# Patient Record
Sex: FEMALE | Race: WHITE
[De-identification: names, ages, dates, MRNs, and addresses within clinical notes are randomized per-mention and may not be internally consistent; named-entity substitution may affect disease eponyms.]

---

## 2017-07-03 ENCOUNTER — HOSPITAL ENCOUNTER (OUTPATIENT)
Dept: HOSPITAL 76 - DI | Age: 47
Discharge: HOME | End: 2017-07-03
Attending: FAMILY MEDICINE
Payer: COMMERCIAL

## 2017-07-03 DIAGNOSIS — M25.521: Primary | ICD-10-CM

## 2017-07-03 NOTE — XRAY REPORT
THREE-VIEW RIGHT ELBOW:  07/03/2017

 

CLINICAL INDICATION:  Pain, numbness.

 

FINDINGS:  AP, lateral, oblique views of the right elbow demonstrate no evidence of fracture or dislo
cation.  The joint spaces are preserved.  No effusion is present.  No foreign body is seen. 

 

IMPRESSION:  NORMAL RIGHT ELBOW. 

 

 

 

DD:07/03/2017 16:03:48  DT: 07/03/2017 17:37  JOB #: [id number]  EXT JOB #:[id number]

## 2017-08-21 ENCOUNTER — HOSPITAL ENCOUNTER (EMERGENCY)
Dept: HOSPITAL 76 - ED | Age: 47
Discharge: HOME | End: 2017-08-21
Payer: COMMERCIAL

## 2017-08-21 VITALS — SYSTOLIC BLOOD PRESSURE: 116 MMHG | DIASTOLIC BLOOD PRESSURE: 73 MMHG

## 2017-08-21 DIAGNOSIS — S50.11XA: Primary | ICD-10-CM
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
## 2017-08-21 PROCEDURE — 99283 EMERGENCY DEPT VISIT LOW MDM: CPT

## 2017-08-21 PROCEDURE — 73090 X-RAY EXAM OF FOREARM: CPT

## 2017-08-21 NOTE — ED PHYSICIAN DOCUMENTATION
PD HPI Fall





- Stated complaint


Stated Complaint: GLF





- Chief complaint


Chief Complaint: Ext Problem





- History obtained from


History obtained from: Patient, Family (Spouse)





- History of Present Illness


Mechanism of injury: Slipped


Fall distance: Sitting position


Where injury occurred: Home


Timing - onset: How many hours ago (<1)


Injury(ies) location: Right Upper Extremity


Associated symptoms: No: LOC, Weakness, Paresthesias, Nausea / vomiting


Worsens with: Palpation


Similar symptoms before: Has not had sx before





- Treatment prior to arrival


Treatment prior to arrival: 





The patient is a 46-year-old female who slipped on her stairs this morning, 

sliding down about 6 steps, and catching herself with her right arm on the 

railing. She presents now with pain and ecchymosis in her right upper 

extremity. She is right-hand dominant.  She denies any other injuries.She does 

not take anticoagulant medication.





Review of Systems


Constitutional: denies: Fever


Ears: denies: Tinnitus/ringing


Nose: denies: Congestion


Cardiac: denies: Chest pain / pressure


Respiratory: denies: Dyspnea


GI: denies: Abdominal Pain, Nausea, Vomiting


Skin: denies: Laceration (s)


Musculoskeletal: reports: Extremity pain (Right upper extremity.).  denies: 

Neck pain, Back pain


Neurologic: denies: Focal weakness, Numbness, Head injury, LOC





PD PAST MEDICAL HISTORY





- Past Medical History


Past Medical History: No





- Past Surgical History


Past Surgical History: Yes





- Present Medications


Home Medications: 


 Ambulatory Orders











 Medication  Instructions  Recorded  Confirmed


 


Naproxen [Naprosyn] 500 mg PO BID PRN #20 tablet 08/21/17 














- Allergies


Allergies/Adverse Reactions: 


 Allergies











Allergy/AdvReac Type Severity Reaction Status Date / Time


 


Sulfa (Sulfonamide Allergy  Emesis Verified 08/21/17 09:07





Antibiotics)     














- Living Situation


Living Situation: reports: With spouse/s.o.





- Social History


Does the pt smoke?: No


Smoking Status: Never smoker


Does the pt drink ETOH?: No


Does the pt have substance abuse?: No





- Immunizations


Immunizations are current?: Yes





- POLST


Patient has POLST: No





PD ED PE NORMAL





- Vitals


Vital signs reviewed: Yes (Borderline hypertension initially.)





- General


General: Alert and oriented X 3, Well developed/nourished





- HEENT


HEENT: Atraumatic, EOMI





- Neck


Neck: No bony TTP, No adenopathy, Other (Full cervical range of motion.)





- Cardiac


Cardiac: RRR, No murmur





- Respiratory


Respiratory: No respiratory distress, Clear bilaterally, Other (No chest wall 

tenderness.)





- Abdomen


Abdomen: Soft, Non tender





- Back


Back: No CVA TTP, No spinal TTP





- Derm


Derm: No rash





- Extremities


Extremities: No deformity, Other (There is ecchymosis at the posterior aspect 

of the right upper arm, the posterior medial aspect of the right proximal 

forearm, as well as the right distal forearm.  She has full range of motion of 

the shoulder, elbow, and wrist, including supination and pronation of the 

forearm. There is tenderness to palpation of the forearm.  Distal neurovascular 

is intact.)





- Neuro


Neuro: Alert and oriented X 3, No motor deficit, No sensory deficit, Normal 

speech





Results





- Vitals


Vitals: 


 Oxygen











O2 Source                      Room air

















PD MEDICAL DECISION MAKING





- ED course


Complexity details: reviewed results, re-evaluated patient, considered 

differential, d/w patient, d/w family


ED course: 


The patient's presentation is significant for contusions to the right arm and 

forearm caused by falling down stairs.  X-ray of the forearm reveals no bony 

abnormality.  Treatment in the emergency department included administration of 

ibuprofen 800 mg orally.  I discussed with her and her  the expected 

course of injury, symptomatic treatment and outpatient follow-up, as well as 

potentially worrisome signs or symptoms that should prompt reevaluation in the 

emergency department.








Departure





- Departure


Disposition: 01 Home, Self Care


Clinical Impression: 


 Fall (on) (from) other stairs and steps, initial encounter





Contusion of right forearm, initial encounter


Qualifiers:


 Encounter type: initial encounter Qualified Code(s): S50.11XA - Contusion of 

right forearm, initial encounter


Condition: Stable


Instructions:  ED Contusion Upper Ext


Follow-Up: 


Julio Montanez DO [Provider Admit Priv/Credential] - 


Prescriptions: 


Naproxen [Naprosyn] 500 mg PO BID PRN #20 tablet


 PRN Reason: Pain


Comments: 


Apply ice pack to the sore areas intermittently for the next 3 days.


You can use Naprosyn as prescribed if needed for pain.  Alternatively you could 

use ibuprofen, up to 800 mg 3 times daily.


Let your pain be your guide to activity level.


Follow-up with your primary physician, or return to the emergency department, 

if you develop markedly increasing pain, or otherwise worsening symptoms.


Discharge Date/Time: 08/21/17 10:27

## 2017-08-21 NOTE — XRAY REPORT
EXAM:

RIGHT FOREARM RADIOGRAPHY

 

EXAM DATE: 8/21/2017 10:01 AM.

 

CLINICAL HISTORY: Fall with injury right forearm. Forearm pain.

 

COMPARISON: None.

 

TECHNIQUE: 2 views.

 

FINDINGS: 

Bones: Normal. No fractures or bone lesions.

 

Joints: Normal. No effusions or subluxations in the visualized wrist or elbow joints.

 

Soft Tissues: Normal. No soft tissue swelling.

 

IMPRESSION: No fracture.

 

[facility]

Referring Provider Line: [phone]

 

SITE ID: [id number]

## 2018-05-22 ENCOUNTER — HOSPITAL ENCOUNTER (EMERGENCY)
Age: 48
Discharge: HOME | End: 2018-05-22
Payer: COMMERCIAL

## 2018-05-22 VITALS
SYSTOLIC BLOOD PRESSURE: 139 MMHG | DIASTOLIC BLOOD PRESSURE: 80 MMHG | OXYGEN SATURATION: 98 % | HEART RATE: 78 BPM | TEMPERATURE: 97.6 F | RESPIRATION RATE: 20 BRPM

## 2018-05-22 VITALS
SYSTOLIC BLOOD PRESSURE: 143 MMHG | HEART RATE: 67 BPM | OXYGEN SATURATION: 99 % | TEMPERATURE: 98.42 F | DIASTOLIC BLOOD PRESSURE: 86 MMHG | RESPIRATION RATE: 16 BRPM

## 2018-05-22 VITALS — BODY MASS INDEX: 30.7 KG/M2

## 2018-05-22 DIAGNOSIS — M54.16: Primary | ICD-10-CM

## 2018-05-22 PROCEDURE — 96372 THER/PROPH/DIAG INJ SC/IM: CPT

## 2018-05-22 PROCEDURE — 99283 EMERGENCY DEPT VISIT LOW MDM: CPT

## 2018-05-22 PROCEDURE — 99282 EMERGENCY DEPT VISIT SF MDM: CPT

## 2018-05-22 NOTE — ED.BACK
"HPI - Back Pain/Injury
General
Chief Complaint: Back Pain/Injury
Stated Complaint: RT LEG PAIN
Time Seen by Provider: 05/22/18 21:28
Source: patient and family
Mode of arrival: ambulatory
Limitations: no limitations
History of Present Illness
HPI Narrative: Patient returns to the emergency department for the 3rd time in the last week.  She has severe right lower back pain with radiation into her right leg.  She denies any urinary or bowel complaints. She denies any numbness, weakness or 
foot drop.  She does state that she has a burning sensation on her medial right thigh and denies any injury or rash. She has been seen, as stated, twice in our department and treated for sciatica with opioids, muscle lack SIRS, anti-inflammatories, 
and steroids.  She has seen her primary care provider whom has suggested physical therapy but she has been unable thus far.
MD Complaint: back pain
Onset (ago): day(s)
Duration: constant
Similar Symptoms Previously: Yes
Location: lumbar spine and right lower back
Severity: severe
Quality: burning, sharp and stabbing
Radiation: right leg
Relieving factors: none
Exacerbating factors: none
Associated symptoms: denies other symptoms
Related Data
Previous Rx's

 Medication  Instructions  Recorded
cyclobenzaprine 10 mg PO TID PRN #7 tab 05/03/18
hydrocodone-acetaminophen 1 tab PO Q4-6H PRN #7 tab 05/03/18
oxycodone-acetaminophen [Percocet] 1 tab PO Q4-6H PRN #15 tab 05/05/18
prednisone 60 mg PO DAILY #15 tab 05/05/18
diazepam [Valium] 5 mg PO TID PRN #14 tab 05/22/18
hydrocodone-acetaminophen 1 tab PO Q4-6H PRN #14 tab 05/22/18
ketorolac 10 mg PO Q6H #20 tab 05/22/18
prednisone See Label Instructions .ROUTE 05/22/18
 .COMPLEX #30 tab 


Allergies

Allergy/AdvReac Type Severity Reaction Status Date / Time
Sulfa (Sulfonamide Allergy Mild Rash Verified 05/03/18 10:13
Antibiotics)     



Review of Systems
Review of Systems
All systems reviewed & are unremarkable except as noted in HPI and below 
Constitutional
Denies chills, Denies fever(s), Denies lethargy and Denies weakness
Eyes
Denies change in vision, Denies eye discharge, Denies irritation and Denies loss of vision
ENT
Ears, Nose, Mouth, and Throat: Denies change in voice, Denies neck pain and Denies sore throat
Cardiovascular
Denies chest pain, Denies irregular heart rhythm, Denies lightheadedness, Denies palpitations, Denies dyspnea, Denies dyspnea on exertion and Denies orthopnea
Respiratory
Denies cough, Denies dyspnea, Denies dyspnea on exertion and Denies wheezing
Genitourinary
Denies hematuria, Denies flank pain, Denies urinary incontinence and Denies urinary urgency
Musculoskeletal
Reports back pain, Reports limited range of motion, Denies neck pain and Reports radiating pain into limb
Integumentary/Breasts
Denies pruritus, Denies erythema, Denies rash and Denies wounds
Neurologic
Denies loss of vision and Denies weakness
Endocrine
Denies palpitations
Hematologic/Lymphatic
Denies easy bruising
Allergic/Immunologic
Denies wheezing

PFSH
Medical History

No significant medical problems (Acute)


Surgical History (Reviewed 05/23/18 @ 03:31 by Og Castaneda DO)

History of facial surgery (Acute)


Social History (Reviewed 05/23/18 @ 03:31 by Og Castaneda DO)
Smoking Status:  Never smoker

alcohol intake:  never

substance use type:  does not use

additional social history:  Social history and family history are otherwise noncontributory 





Exam
Initial Vital Signs
Initial Vital Signs:  Vital Signs

Temperature  98.5 F   05/22/18 21:15
Pulse Rate  67   05/22/18 21:15
Respiratory Rate  16   05/22/18 21:15
Blood Pressure  143/86 H  05/22/18 21:15
Pulse Oximetry  99   05/22/18 21:15


Const
General: cooperative and well developed
Nutritional Appearance: well nourished
Orientation: alert, awake, oriented x3 and not confused
[facility]
Head: normocephalic and atraumatic
Ears: external ears normal and TM's normal bilaterally
Nose: externa
[id number]|2018-05-22 23:04:38|2018-05-22 23:04:38|PC.NURSE||||"provider aware concentration of ordered dilaudid not available at this time. pt give two 0.5mg/0.5ml syringes IM  for pain relief. medication dose concentration ordered unable to scan at this time. "

## 2018-06-06 ENCOUNTER — HOSPITAL ENCOUNTER (OUTPATIENT)
Age: 48
End: 2018-06-06
Payer: COMMERCIAL

## 2018-06-06 DIAGNOSIS — M51.16: Primary | ICD-10-CM

## 2018-06-06 PROCEDURE — 72148 MRI LUMBAR SPINE W/O DYE: CPT

## 2018-06-06 NOTE — DI.MRI.S_ITS
PROCEDURE:  MR LUMBAR SPINE WO CON  
   
INDICATIONS:  LUMBAR RADICULOPATHY  
   
TECHNIQUE:    
Noncontrast sagittal T1 spin echo and T2 fast echo, sagittal STIR, axial T1 and T2 fast   
spin echo through the lumbar spine.  In cases with scoliosis, additional coronal T2 fast   
spin echo may be performed.    
   
COMPARISON:  None.  
   
FINDINGS:    
Image quality:  Excellent.    
   
Alignment and Curvature:  There is normal bony alignment.    
   
Bone Marrow:  Marrow is of normal overall signal.  No acute vertebral body compression   
fractures.    
   
Spinal Cord:  Conus medullaris terminates at the L1 level.  Visualized cord demonstrates   
normal signal and size.    
   
Paraspinous Soft Tissues:  No paravertebral masses.    
   
T12-L1: Normal appearance.    
   
L1-L2:  Normal appearance.    
   
L2-L3: No significant abnormality is seen.  
   
L3-L4:  The disc height and disk signal are well-preserved.  Mild disc bulge is seen.    
There is a focal disc protrusion seen within the right foraminal region, as on series 3   
image 5 and on series 6 image 18.  There is associated moderate right-sided neural   
foraminal narrowing.  No left-sided neural foraminal narrowing is seen.  No significant   
central canal narrowing can be seen.  
   
L4-L5:  The disc height and disk signal are well-preserved.  Mild disc bulge is seen,   
which is eccentric to the right.  There is moderate facet hypertrophy. Moderate bilateral   
neural foraminal narrowing is seen.  Mild central canal narrowing is seen.    
   
L5-S1:  The disc height and disk signal are well-preserved.  Minimal disc bulge is seen.   
Mild facet joint hypertrophy is seen. No significant neural foraminal or central canal   
narrowing are seen.    
   
   
   
IMPRESSION: Focal degenerative change is seen at L3-L4 and L4-L5.  
   
   
Dictated by: Mark Gerard M.D. on 6/06/2018 at 9:10       
Approved by: Mark Gerard M.D. on 6/06/2018 at 9:18

## 2018-09-27 ENCOUNTER — HOSPITAL ENCOUNTER (OUTPATIENT)
Dept: HOSPITAL 76 - LAB | Age: 48
Discharge: HOME | End: 2018-09-27
Attending: ADVANCED PRACTICE MIDWIFE
Payer: COMMERCIAL

## 2018-09-27 DIAGNOSIS — Z01.419: Primary | ICD-10-CM

## 2018-09-27 LAB
CHOLEST SERPL-MCNC: 209 MG/DL
EST. AVERAGE GLUCOSE BLD GHB EST-MCNC: 94 MG/DL (ref 70–100)
FSH SERPL-ACNC: 113.94 MIU/ML
HB2 TOTAL: 15.2 G/DL
HBA1C BLD-MCNC: 0.45 G/DL
HDLC SERPL-MCNC: 56 MG/DL
HDLC SERPL: 3.7 {RATIO} (ref ?–4.4)
HEMOGLOBIN A1C %: 4.9 % (ref 4.6–6.2)
LDLC SERPL CALC-MCNC: 140 MG/DL
LDLC/HDLC SERPL: 2.5 {RATIO} (ref ?–4.4)
LH SERPL-ACNC: 60.02 MIU/ML
TSH SERPL-ACNC: 1.09 UIU/ML (ref 0.34–5.6)
VLDLC SERPL-SCNC: 13 MG/DL

## 2018-09-27 PROCEDURE — 36415 COLL VENOUS BLD VENIPUNCTURE: CPT

## 2018-09-27 PROCEDURE — 83721 ASSAY OF BLOOD LIPOPROTEIN: CPT

## 2018-09-27 PROCEDURE — 84443 ASSAY THYROID STIM HORMONE: CPT

## 2018-09-27 PROCEDURE — 82670 ASSAY OF TOTAL ESTRADIOL: CPT

## 2018-09-27 PROCEDURE — 82947 ASSAY GLUCOSE BLOOD QUANT: CPT

## 2018-09-27 PROCEDURE — 83002 ASSAY OF GONADOTROPIN (LH): CPT

## 2018-09-27 PROCEDURE — 80061 LIPID PANEL: CPT

## 2018-09-27 PROCEDURE — 83036 HEMOGLOBIN GLYCOSYLATED A1C: CPT

## 2018-09-27 PROCEDURE — 83001 ASSAY OF GONADOTROPIN (FSH): CPT
